# Patient Record
Sex: FEMALE | Race: OTHER | Employment: UNEMPLOYED | URBAN - METROPOLITAN AREA
[De-identification: names, ages, dates, MRNs, and addresses within clinical notes are randomized per-mention and may not be internally consistent; named-entity substitution may affect disease eponyms.]

---

## 2022-02-25 ENCOUNTER — HOSPITAL ENCOUNTER (OUTPATIENT)
Age: 32
Discharge: ANOTHER HOSPITAL | End: 2022-02-25
Payer: MEDICAID

## 2022-02-25 VITALS
SYSTOLIC BLOOD PRESSURE: 122 MMHG | WEIGHT: 150 LBS | TEMPERATURE: 99 F | HEIGHT: 66 IN | HEART RATE: 86 BPM | DIASTOLIC BLOOD PRESSURE: 77 MMHG | OXYGEN SATURATION: 97 % | BODY MASS INDEX: 24.11 KG/M2 | RESPIRATION RATE: 18 BRPM

## 2022-02-25 DIAGNOSIS — M54.9 SEVERE BACK PAIN: Primary | ICD-10-CM

## 2022-02-25 PROCEDURE — 99203 OFFICE O/P NEW LOW 30 MIN: CPT

## 2022-02-25 NOTE — ED INITIAL ASSESSMENT (HPI)
Per pt. S/p fall backward last Feb 2/21 seen in hospital w/c CT and x-ray done. Since then afterward pt. Keep having N/V cough with throat problem.